# Patient Record
Sex: FEMALE | Race: OTHER | HISPANIC OR LATINO
[De-identification: names, ages, dates, MRNs, and addresses within clinical notes are randomized per-mention and may not be internally consistent; named-entity substitution may affect disease eponyms.]

---

## 2021-11-29 PROBLEM — Z00.00 ENCOUNTER FOR PREVENTIVE HEALTH EXAMINATION: Status: ACTIVE | Noted: 2021-11-29

## 2021-12-01 ENCOUNTER — APPOINTMENT (OUTPATIENT)
Dept: ORTHOPEDIC SURGERY | Facility: CLINIC | Age: 61
End: 2021-12-01

## 2022-07-06 ENCOUNTER — APPOINTMENT (OUTPATIENT)
Dept: ORTHOPEDIC SURGERY | Facility: CLINIC | Age: 62
End: 2022-07-06

## 2022-07-12 ENCOUNTER — APPOINTMENT (OUTPATIENT)
Dept: ORTHOPEDIC SURGERY | Facility: CLINIC | Age: 62
End: 2022-07-12

## 2022-07-12 ENCOUNTER — OUTPATIENT (OUTPATIENT)
Dept: OUTPATIENT SERVICES | Facility: HOSPITAL | Age: 62
LOS: 1 days | End: 2022-07-12
Payer: MEDICAID

## 2022-07-12 ENCOUNTER — RESULT REVIEW (OUTPATIENT)
Age: 62
End: 2022-07-12

## 2022-07-12 VITALS — WEIGHT: 155 LBS | BODY MASS INDEX: 29.27 KG/M2 | HEIGHT: 61 IN

## 2022-07-12 VITALS — SYSTOLIC BLOOD PRESSURE: 146 MMHG | DIASTOLIC BLOOD PRESSURE: 82 MMHG

## 2022-07-12 DIAGNOSIS — M16.12 UNILATERAL PRIMARY OSTEOARTHRITIS, LEFT HIP: ICD-10-CM

## 2022-07-12 DIAGNOSIS — G57.92 UNSPECIFIED MONONEUROPATHY OF LEFT LOWER LIMB: ICD-10-CM

## 2022-07-12 DIAGNOSIS — G56.02 CARPAL TUNNEL SYNDROME, LEFT UPPER LIMB: ICD-10-CM

## 2022-07-12 PROCEDURE — 72020 X-RAY EXAM OF SPINE 1 VIEW: CPT | Mod: 26

## 2022-07-12 PROCEDURE — 99204 OFFICE O/P NEW MOD 45 MIN: CPT

## 2022-07-12 PROCEDURE — 73502 X-RAY EXAM HIP UNI 2-3 VIEWS: CPT

## 2022-07-12 PROCEDURE — 73502 X-RAY EXAM HIP UNI 2-3 VIEWS: CPT | Mod: 26,LT

## 2022-07-12 PROCEDURE — 72020 X-RAY EXAM OF SPINE 1 VIEW: CPT

## 2022-07-12 NOTE — HISTORY OF PRESENT ILLNESS
[Worsening] : worsening [___ yrs] : [unfilled] year(s) ago [3] : a current pain level of 3/10 [Sitting] : sitting [Standing] : standing [NSAIDs] : relieved by nonsteroidal anti-inflammatory drugs [de-identified] : 7/12/22: 60 y/o Danish speaking female presents for evaluation of left hip pain for 1 year.  Localizes the pain to the anterior aspect of the left hip with some associated radiation of pain down to the mid-thigh. She reports occasional  stiffness of the left hip, mostly occurring first thing in the morning. Patient has used Advil as needed for pain and states it has helped to alleviate her symptoms.  Left hip complaints are overall still mild and do not limit her in daily activities or work functions. She also reports occasional right hip "discomfort" but denies pain. Patient denies attempting any other treatments for the left hip. 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 [Hip Movement] : worsened by hip movement [Walking] : worsened by walking [de-identified] : describes cramping and weakness

## 2022-07-12 NOTE — DISCUSSION/SUMMARY
[de-identified] : 62y/o female with left hip osteoarthritis\par - PT\par - HEP\par - Tylenol + Meloxicam as needed for pain\par - Referral to Hand and Podiatry for left hand and foot complaints\par - RTC in 3 months, no new XRs needed. We discussed that JOCELYN may be considered in the future depending on her clinical progress.

## 2022-07-12 NOTE — PHYSICAL EXAM
[de-identified] : General appearance: well nourished and hydrated, pleasant, alert and oriented x 3, cooperative.  \par HEENT: normocephalic, EOM intact, wearing mask, external auditory canal clear.  \par Cardiovascular: no lower leg edema, no varicosities, dorsalis pedis pulses palpable and symmetric.  \par Lymphatics: no palpable lymphadenopathy, no lymphedema.  \par Neurologic: sensation is normal, no muscle weakness in upper or lower extremities, patella tendon reflexes present and symmetric.  \par Dermatologic: skin moist, warm, no rash.  \par Spine: cervical spine with normal lordosis and painless range of motion, thoracic spine with normal kyphosis and painless range of motion, lumbosacral spine with normal lordosis and painless range of motion. \par Gait: normal.  \par \par Limb lengths: equal\par \par Left hip: all fields negative/normal/unremarkable unless otherwise noted --\par - Focal soft tissue swelling: \par - Ecchymosis: \par - Erythema: \par - Wounds: \par - Tenderness: mild anterior and lateral TTP\par - ROM: \par   - Flexion: 105\par   - Extension: 0\par   - Adduction: 10\par   - Abduction: 30\par   - Internal rotation in 90 degrees of hip flexion: 10\par   - External rotation in 90 degrees of hip flexion: 40\par - JOSELIN: uncomfortable\par - FADIR: painful\par - Kellen: \par - Stinchfield:  positive\par - Flexor power: 4/5\par - Abductor power: 5/5\par \par Right hip: all fields negative/normal/unremarkable unless otherwise noted --\par - Focal soft tissue swelling: \par - Ecchymosis: \par - Erythema: \par - Wounds: \par - Tenderness: \par - ROM: \par   - Flexion: 120\par   - Extension: 0\par   - Adduction: 10\par   - Abduction: 70\par   - Internal rotation in 90 degrees of hip flexion: 10\par   - External rotation in 90 degrees of hip flexion: 80\par - JOSELIN: \par - FADIR: \par - Kellen: \par - Stinchfield: \par - Flexor power: 5/5\par - Abductor power: 5/5 [de-identified] : A lateral view of the lumbosacral spine, weightbearing AP pelvis, and 2 additional views (frog lateral and false profile) of the hip were interpreted by me and reviewed with the patient.\par \par Location of imaging: [facility]\par Date of exam: 7/12/22\par \par Lumbar spine --\par Alignment: normal\par Spondylosis: none\par Listhesis: none\par Posterior elements: mild multilevel facet arthrosis\par \par Pelvic alignment: normal. Pubic symphyseal arthrosis, possible prior healed fracture deformity\par \par Right hip --\par Arthritis: minimal\par Deformity: minimal dysplasia\par Osteonecrosis: none\par \par Left hip --\par Arthritis: moderate; mostly central joint space narrowing\par Deformity: none. Kat A femur\par Osteonecrosis: likely secondary sclerotic and cystic change

## 2022-08-13 ENCOUNTER — NON-APPOINTMENT (OUTPATIENT)
Age: 62
End: 2022-08-13

## 2022-10-05 ENCOUNTER — APPOINTMENT (OUTPATIENT)
Dept: ORTHOPEDIC SURGERY | Facility: CLINIC | Age: 62
End: 2022-10-05

## 2022-11-15 ENCOUNTER — RX RENEWAL (OUTPATIENT)
Age: 62
End: 2022-11-15

## 2022-11-15 RX ORDER — MELOXICAM 7.5 MG/1
7.5 TABLET ORAL DAILY
Qty: 30 | Refills: 2 | Status: ACTIVE | COMMUNITY
Start: 2022-07-12 | End: 1900-01-01